# Patient Record
Sex: MALE | Race: WHITE | NOT HISPANIC OR LATINO | URBAN - METROPOLITAN AREA
[De-identification: names, ages, dates, MRNs, and addresses within clinical notes are randomized per-mention and may not be internally consistent; named-entity substitution may affect disease eponyms.]

---

## 2018-03-29 ENCOUNTER — EMERGENCY (EMERGENCY)
Facility: HOSPITAL | Age: 28
LOS: 0 days | Discharge: HOME | End: 2018-03-29
Attending: EMERGENCY MEDICINE

## 2018-03-29 VITALS
HEART RATE: 115 BPM | SYSTOLIC BLOOD PRESSURE: 142 MMHG | DIASTOLIC BLOOD PRESSURE: 91 MMHG | RESPIRATION RATE: 18 BRPM | HEIGHT: 68 IN | TEMPERATURE: 97 F | WEIGHT: 169.98 LBS | OXYGEN SATURATION: 98 %

## 2018-03-29 VITALS
HEART RATE: 98 BPM | RESPIRATION RATE: 18 BRPM | OXYGEN SATURATION: 100 % | DIASTOLIC BLOOD PRESSURE: 73 MMHG | SYSTOLIC BLOOD PRESSURE: 133 MMHG

## 2018-03-29 DIAGNOSIS — Z98.890 OTHER SPECIFIED POSTPROCEDURAL STATES: Chronic | ICD-10-CM

## 2018-03-29 DIAGNOSIS — Z90.49 ACQUIRED ABSENCE OF OTHER SPECIFIED PARTS OF DIGESTIVE TRACT: ICD-10-CM

## 2018-03-29 DIAGNOSIS — L50.9 URTICARIA, UNSPECIFIED: ICD-10-CM

## 2018-03-29 DIAGNOSIS — R21 RASH AND OTHER NONSPECIFIC SKIN ERUPTION: ICD-10-CM

## 2018-03-29 DIAGNOSIS — R00.0 TACHYCARDIA, UNSPECIFIED: ICD-10-CM

## 2018-03-29 RX ORDER — DIPHENHYDRAMINE HCL 50 MG
50 CAPSULE ORAL ONCE
Qty: 0 | Refills: 0 | Status: COMPLETED | OUTPATIENT
Start: 2018-03-29 | End: 2018-03-29

## 2018-03-29 RX ADMIN — Medication 60 MILLIGRAM(S): at 09:44

## 2018-03-29 RX ADMIN — Medication 50 MILLIGRAM(S): at 09:43

## 2018-03-29 NOTE — ED PROVIDER NOTE - NS ED ROS FT
Review of Systems    Constitutional: (-) fever  Eyes/ENT: No lip, throat, tongue swelling  Cardiovascular: (-) chest pain, (-) syncope  Respiratory: (-) cough, (-) shortness of breath  Gastrointestinal: (-) vomiting, (-) diarrhea  Integumentary: (+) rash,   Allergic/Immunologic: (-) pruritus

## 2018-03-29 NOTE — ED ADULT TRIAGE NOTE - CHIEF COMPLAINT QUOTE
"I broke out in a rash yesterday. It's itchy. My feet are red and swollen on the bottom as well." Pt states he had an earache and sore throat for three days. That resolved yesterday but then rash occurred. Symptoms unrelieved by Benadryl (taken yesterday) and Hydrocortisone cream (taken yesterday and today).

## 2018-03-29 NOTE — ED PROVIDER NOTE - ATTENDING CONTRIBUTION TO CARE
I personally evaluated the patient. I reviewed the Resident’s or Physician Assistant’s note (as assigned above), and agree with the findings and plan except as documented in my note.  urticaria as above, normal phonation, no stridor, no swelling to lips, tongue, floor of mouth, palate, or uvula, lungs cta

## 2018-03-29 NOTE — ED PROVIDER NOTE - PHYSICAL EXAMINATION
Gen: Alert, NAD, well appearing  Head: NC, AT, PERRL, EOMI, normal lids/conjunctiva  ENT: normal hearing, patent oropharynx without erythema/exudate. No lip, throat, tongue swelling  Pulm: Bilateral BS, normal resp effort, no wheeze/stridor/retractions  CV: s1s2, tachy  Skin: +urticaria like rash to noted to extremities and trunk  Neuro: AAOx3, no sensory/motor deficits

## 2018-03-29 NOTE — ED PROVIDER NOTE - OBJECTIVE STATEMENT
27 yo male c/o rash to body. This past week he had sore throat and ear pain which resolved. Yesterday he noticed rash to body. +itchy. Took Benadryl yesterday with improvement but rash is coming and going. No SOB. No lip, throat, tongue swelling.

## 2018-09-26 ENCOUNTER — OUTPATIENT (OUTPATIENT)
Dept: OUTPATIENT SERVICES | Facility: HOSPITAL | Age: 28
LOS: 1 days | Discharge: HOME | End: 2018-09-26

## 2018-09-26 DIAGNOSIS — F11.20 OPIOID DEPENDENCE, UNCOMPLICATED: ICD-10-CM

## 2018-09-26 DIAGNOSIS — Z98.890 OTHER SPECIFIED POSTPROCEDURAL STATES: Chronic | ICD-10-CM

## 2018-10-09 ENCOUNTER — OUTPATIENT (OUTPATIENT)
Dept: OUTPATIENT SERVICES | Facility: HOSPITAL | Age: 28
LOS: 1 days | Discharge: HOME | End: 2018-10-09

## 2018-10-09 DIAGNOSIS — F11.20 OPIOID DEPENDENCE, UNCOMPLICATED: ICD-10-CM

## 2018-10-09 DIAGNOSIS — Z98.890 OTHER SPECIFIED POSTPROCEDURAL STATES: Chronic | ICD-10-CM

## 2022-08-02 ENCOUNTER — OUTPATIENT (OUTPATIENT)
Dept: OUTPATIENT SERVICES | Facility: HOSPITAL | Age: 32
LOS: 1 days | Discharge: HOME | End: 2022-08-02

## 2022-08-02 ENCOUNTER — APPOINTMENT (OUTPATIENT)
Dept: PSYCHIATRY | Facility: CLINIC | Age: 32
End: 2022-08-02

## 2022-08-02 DIAGNOSIS — F10.20 ALCOHOL DEPENDENCE, UNCOMPLICATED: ICD-10-CM

## 2022-08-02 DIAGNOSIS — Z98.890 OTHER SPECIFIED POSTPROCEDURAL STATES: Chronic | ICD-10-CM

## 2022-08-09 ENCOUNTER — APPOINTMENT (OUTPATIENT)
Dept: PSYCHIATRY | Facility: CLINIC | Age: 32
End: 2022-08-09

## 2022-08-11 ENCOUNTER — OUTPATIENT (OUTPATIENT)
Dept: OUTPATIENT SERVICES | Facility: HOSPITAL | Age: 32
LOS: 1 days | Discharge: HOME | End: 2022-08-11

## 2022-08-11 ENCOUNTER — APPOINTMENT (OUTPATIENT)
Dept: PSYCHIATRY | Facility: CLINIC | Age: 32
End: 2022-08-11

## 2022-08-11 DIAGNOSIS — F11.20 OPIOID DEPENDENCE, UNCOMPLICATED: ICD-10-CM

## 2022-08-11 DIAGNOSIS — Z98.890 OTHER SPECIFIED POSTPROCEDURAL STATES: Chronic | ICD-10-CM

## 2023-07-07 ENCOUNTER — APPOINTMENT (OUTPATIENT)
Dept: ORTHOPEDIC SURGERY | Facility: CLINIC | Age: 33
End: 2023-07-07
Payer: OTHER MISCELLANEOUS

## 2023-07-07 PROBLEM — Z00.00 ENCOUNTER FOR PREVENTIVE HEALTH EXAMINATION: Status: ACTIVE | Noted: 2023-07-07

## 2023-07-07 PROCEDURE — 99203 OFFICE O/P NEW LOW 30 MIN: CPT | Mod: ACP,25

## 2023-07-07 PROCEDURE — 73610 X-RAY EXAM OF ANKLE: CPT | Mod: LT

## 2023-07-07 PROCEDURE — 29405 APPL SHORT LEG CAST: CPT | Mod: LT

## 2023-07-11 NOTE — PHYSICAL EXAM
[Left] : left foot and ankle [Moderate] : moderate diffused ankle swelling [2+] : posterior tibialis pulse: 2+ [] : no calcaneus tenderness [FreeTextEntry9] : Limited ROM secondary to swelling/pain [de-identified] : Difficult to assess secondary to limited ROM/pain

## 2023-07-11 NOTE — IMAGING
[de-identified] : Weightbearing x-rays taken of the patient's left ankle in office today revealed a mildly displaced fracture of the medial malleolus with soft tissue swelling noted.  Otherwise, no other significant normalities were seen.
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
## 2023-07-11 NOTE — IMAGING
[de-identified] : Weightbearing x-rays taken of the patient's left ankle in office today revealed a mildly displaced fracture of the medial malleolus with soft tissue swelling noted.  Otherwise, no other significant normalities were seen.
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
## 2023-07-11 NOTE — PHYSICAL EXAM
[Left] : left foot and ankle [Moderate] : moderate diffused ankle swelling [2+] : posterior tibialis pulse: 2+ [] : no calcaneus tenderness [FreeTextEntry9] : Limited ROM secondary to swelling/pain [de-identified] : Difficult to assess secondary to limited ROM/pain
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
## 2023-07-13 ENCOUNTER — APPOINTMENT (OUTPATIENT)
Dept: PAIN MANAGEMENT | Facility: CLINIC | Age: 33
End: 2023-07-13

## 2023-07-14 ENCOUNTER — APPOINTMENT (OUTPATIENT)
Dept: PAIN MANAGEMENT | Facility: CLINIC | Age: 33
End: 2023-07-14
Payer: OTHER MISCELLANEOUS

## 2023-07-14 DIAGNOSIS — G44.309 POST-TRAUMATIC HEADACHE, UNSPECIFIED, NOT INTRACTABLE: ICD-10-CM

## 2023-07-14 DIAGNOSIS — M54.2 CERVICALGIA: ICD-10-CM

## 2023-07-14 DIAGNOSIS — M54.12 RADICULOPATHY, CERVICAL REGION: ICD-10-CM

## 2023-07-14 DIAGNOSIS — M54.50 LOW BACK PAIN, UNSPECIFIED: ICD-10-CM

## 2023-07-14 DIAGNOSIS — M54.6 PAIN IN THORACIC SPINE: ICD-10-CM

## 2023-07-14 PROCEDURE — 99204 OFFICE O/P NEW MOD 45 MIN: CPT

## 2023-07-17 ENCOUNTER — APPOINTMENT (OUTPATIENT)
Dept: ORTHOPEDIC SURGERY | Facility: CLINIC | Age: 33
End: 2023-07-17
Payer: OTHER MISCELLANEOUS

## 2023-07-17 PROCEDURE — 73610 X-RAY EXAM OF ANKLE: CPT | Mod: LT

## 2023-07-17 PROCEDURE — 99213 OFFICE O/P EST LOW 20 MIN: CPT | Mod: ACP,25

## 2023-07-17 PROCEDURE — 29405 APPL SHORT LEG CAST: CPT

## 2023-07-17 NOTE — DISCUSSION/SUMMARY
[de-identified] : At this time the patient was placed in a new well-fitted well molded fiberglass short leg cast.  Patient was again advised he should be nonweightbearing with crutches and should not be walking on the cast.  I sent a prescription for 81 mg aspirin twice daily to the pharmacy for DVT prophylaxis.  Patient again was requesting Percocet.  He saw pain management a few days ago.  I discussed with him he needs to discuss all pain medications with pain management.  He was also requesting a prescription for Seroquel which I also advised we cannot prescribe, that he needs to get that from his primary doctor or whoever normally prescribes that to him.  We will see him back in 2 weeks for repeat x-rays and evaluation. Patient will call me if any other problems or concerns.  Patient verbalized understanding and agreed with the plan, all questions were answered in the office today.\par

## 2023-07-17 NOTE — HISTORY OF PRESENT ILLNESS
[de-identified] : 33-year-old male is here today for a cast check.  Patient states the bottom of his cast is falling off.  Patient was placed in a cast 10 days ago for a medial malleolus fracture.  He was advised to be nonweightbearing but has been walking on the cast.  He states he is still having pain in the ankle.  He denies any calf pain.

## 2023-07-17 NOTE — IMAGING
[de-identified] : On examination of his left ankle, the cast was removed.  Skin is intact, there is a small healing abrasion on the anterior aspect of the ankle and the great toe.  No erythema, no ecchymosis.  He  over the medial malleolus.  Calf is soft and nontender.  He is able to dorsiflex and plantarflex, sensation is intact throughout, 2+ DP and PT pulses.\par \par X-rays repeated in the office today of the left ankle show a nondisplaced medial malleolus fracture, the alignment is unchanged.  X-rays were reviewed with Dr. Urena.

## 2023-07-18 NOTE — PHYSICAL EXAM
[de-identified] : Cervical Spine Exam:\par \par Inspection:\par erythema (-) \par ecchymosis (-) \par rashes (-) \par \par Palpation:                                        \par Cervical paraspinal mm tenderness:   R (+); L(+)\par Upper trapezius mm tenderness:        R (+); L (+)\par Rhomboids tenderness:                       R (+); L (+)\par Scalenes mm tenderness:                   R (-); L (-)\par Occipital Ridge:                                    R (-); L (-)\par Supraspinatus mm tenderness:           R (-); L (-)\par \par ROM:  \par limited rom 2/2 to pain\par \par Strength Testing:            Right    Left\par Deltoid                             (5/5)    (5/5)\par Biceps:                            (5/5)    (5/5)\par Triceps:                           (5/5)    (5/5)\par Finger Abductors:           (5/5)    (5/5)\par Grasp:                             (5/5)    (5/5)\par \par Special Testing:\par Spurling Test:                  R (-); L (-)\par Facet load test:               R (+); L (+)          \par \par \par Lumbar Spine Exam:\par Inspection:\par erythema (-)\par ecchymosis (-)\par rashes (-)\par alignment: no scoliosis\par \par Palpation:\par Midline lumbar tenderness:             (-)\par paraspinal tenderness:                  L (+) ; R (+)\par sciatic nerve tenderness :             L (+) ; R (+)\par SI joint tenderness:                        L (-) ; R (-)\par GTB tenderness:                            L (-);  R (-)\par \par Limited ROM 2/2 to pain\par \par Strength:\par 5/5 throughout all muscle groups of the lower extremity\par \par                                    Right       Left   \par Hip Flexion:                (5/5)       (5/5)\par Quadriceps:               (5/5)       (5/5)\par Hamstrings:                (5/5)       (5/5)\par Ankle Dorsiflexion:     (5/5)       (5/5)\par EHL:                           (5/5)       (5/5)\par Ankle Plantarflexion:  (5/5)       (5/5)\par \par Special Tests:\par SLR:                           R (-) ; L (+)\par Facet loading:            R (-) ; L (-)\par CHANTAL test:               R (-) ; L (-)\par \par Neurologic:\par Light touch intact throughout LE \par Reflexes normal and symmetric \par \par Gait:\par antalgic gait 2/2 to foot cast\par ambulates w/o assistive device

## 2023-07-18 NOTE — DISCUSSION/SUMMARY
[de-identified] : Recommendations\par 1. MRI cervical spine w/o contrast to evaluate for anatomic changes of the lumbar discs, nerves, and surrounding tissue that will help provide information to accurately diagnose the patient's cause of pain and therefore treat said pain generator in the most effective way possible - whether that be specific physical therapy recommendations, medications, and/or interventional therapies. \par \par 2. MRI lumbar spine w/o contrast to evaluate for anatomic changes of the lumbar discs, nerves, and surrounding tissue that will help provide information to accurately diagnose the patient's cause of pain and therefore treat said pain generator in the most effective way possible - whether that be specific physical therapy recommendations, medications, and/or interventional therapies. \par \par 3. MRI head w/o contrast\par \par 4. MRI thoracic spine w/o contrast to evaluate for anatomic changes of the lumbar discs, nerves, and surrounding tissue that will help provide information to accurately diagnose the patient's cause of pain and therefore treat said pain generator in the most effective way possible - whether that be specific physical therapy recommendations, medications, and/or interventional therapies. \par \par 5. We are obtaining an EMG to assess the health of muscles and the nerves that control them\par \par 6. methocarbamol 750mg qHS\par We are prescribing a muscle relaxant medication to help the patient's muscle spasm pain. The patient understands to not take this medication before driving or operating any heavy machinery as it can be sedating.\par
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
## 2023-07-20 ENCOUNTER — NON-APPOINTMENT (OUTPATIENT)
Age: 33
End: 2023-07-20

## 2023-07-31 ENCOUNTER — APPOINTMENT (OUTPATIENT)
Dept: ORTHOPEDIC SURGERY | Facility: CLINIC | Age: 33
End: 2023-07-31
Payer: OTHER MISCELLANEOUS

## 2023-07-31 PROCEDURE — L4350: CPT | Mod: LT

## 2023-07-31 PROCEDURE — 73610 X-RAY EXAM OF ANKLE: CPT | Mod: LT

## 2023-07-31 PROCEDURE — 99213 OFFICE O/P EST LOW 20 MIN: CPT

## 2023-08-02 NOTE — PHYSICAL EXAM
[de-identified] : Skin is intact.  No deformity.  He demonstrates full range of motion of the ankle hindfoot midfoot forefoot.  He is minimally tender to the medial malleolus.  No deformity.  Neurovascular intact distally.

## 2023-08-02 NOTE — DATA REVIEWED
[FreeTextEntry1] : 3 views of the patient's left ankle ordered and reviewed by me personally.  They demonstrate evidence of a essentially healed medial malleolus fracture.  The ankle joint is congruent.
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
## 2023-08-02 NOTE — DISCUSSION/SUMMARY
[de-identified] : Patient is actually doing quite well in regards to his left ankle.  I would like him to start weightbearing as tolerated in a Aircast.  He can utilize home range of motion exercises.  I will see him back in 5 weeks for repeat clinical and radiographic exam.  All questions answered

## 2023-08-28 ENCOUNTER — APPOINTMENT (OUTPATIENT)
Dept: ORTHOPEDIC SURGERY | Facility: CLINIC | Age: 33
End: 2023-08-28
Payer: OTHER MISCELLANEOUS

## 2023-08-28 PROCEDURE — 99213 OFFICE O/P EST LOW 20 MIN: CPT

## 2023-08-28 PROCEDURE — 73610 X-RAY EXAM OF ANKLE: CPT | Mod: LT
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
## 2023-08-28 NOTE — DATA REVIEWED
[FreeTextEntry1] : 3 views of the patient's left ankle ordered reviewed by me personally.  X-rays demonstrate a healed medial malleolus fracture.  The ankle joint is congruent.

## 2023-08-28 NOTE — HISTORY OF PRESENT ILLNESS
[de-identified] : 33-year-old patient here for follow-up of his left medial malleolus fracture.  He is doing better.  He is wearing the brace.  He has not yet started physical therapy.  He has pain at times but overall the pain is improving.  Does not endorse any other symptoms.

## 2023-08-28 NOTE — PHYSICAL EXAM
[de-identified] : Tender at the medial malleolus alone.  This is less so than her prior visit.  Grossly intact range of motion.  Negative Homans' sign.  Neurovascular intact.

## 2023-09-25 ENCOUNTER — APPOINTMENT (OUTPATIENT)
Dept: ORTHOPEDIC SURGERY | Facility: CLINIC | Age: 33
End: 2023-09-25
Payer: OTHER MISCELLANEOUS

## 2023-09-25 PROCEDURE — 73610 X-RAY EXAM OF ANKLE: CPT | Mod: LT

## 2023-09-25 PROCEDURE — 99213 OFFICE O/P EST LOW 20 MIN: CPT

## 2023-11-15 ENCOUNTER — APPOINTMENT (OUTPATIENT)
Dept: ORTHOPEDIC SURGERY | Facility: CLINIC | Age: 33
End: 2023-11-15
Payer: OTHER MISCELLANEOUS

## 2023-11-15 VITALS — HEIGHT: 70 IN | WEIGHT: 195 LBS | BODY MASS INDEX: 27.92 KG/M2

## 2023-11-15 PROCEDURE — 99204 OFFICE O/P NEW MOD 45 MIN: CPT

## 2023-11-15 PROCEDURE — L3908: CPT | Mod: LT

## 2023-12-06 ENCOUNTER — APPOINTMENT (OUTPATIENT)
Dept: ORTHOPEDIC SURGERY | Facility: CLINIC | Age: 33
End: 2023-12-06
Payer: OTHER MISCELLANEOUS

## 2023-12-06 PROCEDURE — 99213 OFFICE O/P EST LOW 20 MIN: CPT

## 2023-12-06 PROCEDURE — 73610 X-RAY EXAM OF ANKLE: CPT | Mod: LT

## 2023-12-07 NOTE — ED PROVIDER NOTE - CARE PLAN
If any questions arise, call your provider.  If your provider is not available, please feel free to call the [facility] at [phone].    MEDICATIONS: Resume taking daily medication.  Take prescribed pain medication with food.  If no medication is prescribed, you may take non-aspirin pain medication if needed.  PAIN MEDICATION CAN BE VERY CONSTIPATING.  Take a stool softener or laxative such as senokot, pericolace, or milk of magnesia if needed.    Medication given to you : Tylenol at 2:40 PM, next dose can be given at 8:40 PM    What to Expect Post Anesthesia    Rest and take it easy for the first 24 hours.  A responsible adult is recommended to remain with you during that time.  It is normal to feel sleepy.  We encourage you to not do anything that requires balance, judgment or coordination.    FOR 24 HOURS DO NOT:  Drive, operate machinery or run household appliances.  Drink beer or alcoholic beverages.  Make important decisions or sign legal documents.    To avoid nausea, slowly advance diet as tolerated, avoiding spicy or greasy foods for the first day.  Add more substantial food to your diet according to your provider's instructions.  Babies can be fed formula or breast milk as soon as they are hungry.  INCREASE FLUIDS AND FIBER TO AVOID CONSTIPATION.    MILD FLU-LIKE SYMPTOMS ARE NORMAL.  YOU MAY EXPERIENCE GENERALIZED MUSCLE ACHES, THROAT IRRITATION, HEADACHE AND/OR SOME NAUSEA.      
Principal Discharge DX:	Rash

## 2023-12-14 ENCOUNTER — NON-APPOINTMENT (OUTPATIENT)
Age: 33
End: 2023-12-14

## 2023-12-15 ENCOUNTER — APPOINTMENT (OUTPATIENT)
Dept: ORTHOPEDIC SURGERY | Facility: CLINIC | Age: 33
End: 2023-12-15
Payer: OTHER MISCELLANEOUS

## 2023-12-15 PROCEDURE — 99213 OFFICE O/P EST LOW 20 MIN: CPT

## 2023-12-15 NOTE — ASSESSMENT
[FreeTextEntry1] : Patient comes in for follow-up of his wrist.  He is doing better than prior.  He still having some discomfort.  He is currently in therapy for his foot.  He is interested in therapy for the wrist.  The therapist that he goes to told him that they could do therapy on his wrist.  He says he is doing better than prior.  He says he still gets numbness and tingling at night but he does not always wear the brace at night and he also says that he is on a couch so he is not sure if that has anything to do with it as well.  He does use a wrist widget but does not use it to drive because he finds it to be annoying.  L hand:  Tender volar wrist  Good finger ROM  +Tinels  +Phalens  +Compression test  normal sensation median nerve distribution  L wrist: Swelling Tender TFCC Pain with pronation/supination Decreased wrist ROM +TFCC grind we reviewed the anatomy, pathology, and treatment options for TFCC tears. We discussed that most of the TFCC is avascular and tears are slow to heal if at all but that surgical results are not guaranteed due to the poor healing capacity of the structure.  Even at surgery, most tears cannot be repaired, but are merely debrided.  We discussed the use of nsaids, bracing, rest, local modalities, injection and surgery in the treatment of TFCC tears. At this point, the patient will proceed with non-operative treatment.  The patient is going to continue using the wrist widget.  As long as he improves he will continue that fashion.  In addition I believe it is acceptable to do therapy.  While it may not fully help him and may offer him significant relief and I have had mixed results and therefore I believe that it would be appropriate.  The patient was advised of the diagnosis.  The natural history of the pathology was explained in full to the patient in layman's terms. We discussed the nature of the nerve as an electrical cable and what happens to the nerve in carpal tunnel syndrome.  We discussed that treatment for night symptoms included night bracing.  We discussed the possibility of injection when symptoms were intermittent or in patients who were unwilling to undergo surgery with constant symptoms.  We discussed that injection is a diagnostic and therapeutic aide and what this means.  We discussed the use of nerve testing in cases when diagnosis was in doubt or for confirmation to exclude alternate pathology.  We discussed that if symptoms were 24/7 surgery was recommended to give the nerve the best chance to recover but that once symptoms were constant, the nerve may not recover even with surgery.  We discussed that if left alone the nerve progression could worsen and that treatment was indicated to prevent progression of nerve compression.  The longer the nerve is left, the more likely to cause worsening irreversible damage.  All questions were answered.  The risks and benefits of surgical and non-surgical treatment alternatives were explained in full to the patient.  I am hoping that wearing the brace at night will help with the numbness and tingling.  If it becomes persistent and regular we may consider an injection.

## 2023-12-15 NOTE — WORK
[Total (100%)] : total (100%) [Does not reveal pre-existing condition(s) that may affect treatment/prognosis] : does not reveal pre-existing condition(s) that may affect treatment/prognosis [Cannot return to work because ________] : cannot return to work because [unfilled] [I provided the services listed above] :  I provided the services listed above. [FreeTextEntry1] : fair [FreeTextEntry3] : lg

## 2024-01-26 ENCOUNTER — APPOINTMENT (OUTPATIENT)
Dept: ORTHOPEDIC SURGERY | Facility: CLINIC | Age: 34
End: 2024-01-26

## 2024-03-18 ENCOUNTER — APPOINTMENT (OUTPATIENT)
Dept: ORTHOPEDIC SURGERY | Facility: CLINIC | Age: 34
End: 2024-03-18
Payer: OTHER MISCELLANEOUS

## 2024-03-18 DIAGNOSIS — S82.52XA DISPLACED FRACTURE OF MEDIAL MALLEOLUS OF LEFT TIBIA, INITIAL ENCOUNTER FOR CLOSED FRACTURE: ICD-10-CM

## 2024-03-18 PROCEDURE — 99243 OFF/OP CNSLTJ NEW/EST LOW 30: CPT

## 2024-03-19 ENCOUNTER — APPOINTMENT (OUTPATIENT)
Dept: ORTHOPEDIC SURGERY | Facility: CLINIC | Age: 34
End: 2024-03-19
Payer: OTHER MISCELLANEOUS

## 2024-03-19 DIAGNOSIS — S69.82XA OTHER SPECIFIED INJURIES OF LEFT WRIST, HAND AND FINGER(S), INITIAL ENCOUNTER: ICD-10-CM

## 2024-03-19 DIAGNOSIS — G56.02 CARPAL TUNNEL SYNDROME, LEFT UPPER LIMB: ICD-10-CM

## 2024-03-19 PROCEDURE — 99243 OFF/OP CNSLTJ NEW/EST LOW 30: CPT

## 2024-03-19 NOTE — ASSESSMENT
[FreeTextEntry1] : The patient comes in for a schedule loss of use. The patient states he is doing well. He states he has numbness and tingling sometimes.    Left Wrist: 90 degrees flexion, 60 degrees extension. 40 degrees ulnar deviation, 10 degrees radial deviation 80 degrees supination , 80 degrees pronation, negative Tinel's, positive Durkan's  dynamometer 115 lbs of force, 75 lbs of force, 55 lbs of force  Right Wrist: 90  degrees flexion, 80 degrees extension , 40 degrees ulnar deviation, 20 degrees radial deviation full supination, full pronation, negative Tinel's, negative Durkan's  dynamometer 90lbs of force, 80lbs of force, 78 lbs of force.  :

## 2024-03-20 NOTE — HISTORY OF PRESENT ILLNESS
[de-identified] : Patient is here for scheduled loss use.  This patient is experiencing some mild residual pain above the medial malleolus which is worsened with weightbearing.  Denies any fevers chills calf pain chest pain shortness of breath.
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
## 2024-03-20 NOTE — PHYSICAL EXAM
[de-identified] : He is alert oriented x 3.  He is pleasant cooperative that exam.  I examined his left lower extremity.  He is slightly tender to the medial malleolus.  No bony crepitus.  No instability at the fracture site.  I then measured the range of motion of his left ankle as well as his right ankle for comparison sake utilizing a goniometer.  Both active and passive range of motion were utilized and 3 separate measurements were taken to ensure consistent measurement.  Below are the averages of the 3 separate measurements.  Left ankle  Dorsiflexion 15 degrees Plantarflexion 30 degrees Eversion 20 degrees Inversion 30 degrees  Right ankle  Dorsiflexion 20 degrees Plantarflexion 40 degrees Eversion 20 degrees Inversion 30 degrees  30 percent loss considering range of motion changes in the presence of a prior malleoli fracture

## 2024-04-15 ENCOUNTER — APPOINTMENT (OUTPATIENT)
Dept: CARDIOLOGY | Facility: CLINIC | Age: 34
End: 2024-04-15
Payer: COMMERCIAL

## 2024-04-15 VITALS
OXYGEN SATURATION: 97 % | HEIGHT: 70 IN | BODY MASS INDEX: 29.63 KG/M2 | DIASTOLIC BLOOD PRESSURE: 92 MMHG | WEIGHT: 207 LBS | HEART RATE: 114 BPM | SYSTOLIC BLOOD PRESSURE: 147 MMHG

## 2024-04-15 DIAGNOSIS — Z72.89 OTHER PROBLEMS RELATED TO LIFESTYLE: ICD-10-CM

## 2024-04-15 DIAGNOSIS — R00.0 TACHYCARDIA, UNSPECIFIED: ICD-10-CM

## 2024-04-15 PROCEDURE — 99204 OFFICE O/P NEW MOD 45 MIN: CPT | Mod: 25

## 2024-04-15 PROCEDURE — 93000 ELECTROCARDIOGRAM COMPLETE: CPT

## 2024-04-15 RX ORDER — MELOXICAM 15 MG/1
15 TABLET ORAL
Qty: 30 | Refills: 0 | Status: DISCONTINUED | COMMUNITY
Start: 2023-07-14 | End: 2024-04-15

## 2024-04-15 RX ORDER — METHOCARBAMOL 750 MG/1
750 TABLET, FILM COATED ORAL
Qty: 30 | Refills: 0 | Status: DISCONTINUED | COMMUNITY
Start: 2023-07-14 | End: 2024-04-15

## 2024-04-15 RX ORDER — TRAMADOL HYDROCHLORIDE 50 MG/1
50 TABLET, COATED ORAL
Qty: 20 | Refills: 0 | Status: DISCONTINUED | COMMUNITY
Start: 2023-07-08 | End: 2024-04-15

## 2024-04-15 RX ORDER — ASPIRIN ENTERIC COATED TABLETS 81 MG 81 MG/1
81 TABLET, DELAYED RELEASE ORAL TWICE DAILY
Qty: 60 | Refills: 0 | Status: DISCONTINUED | COMMUNITY
Start: 2023-07-17 | End: 2024-04-15

## 2024-04-15 NOTE — ASSESSMENT
[FreeTextEntry1] : 35 yo noted today EKG  sinus tach inc RBBB. He denies chest pain or sob. No mcdonald. He boxes at night  He in am does wts  90 minutes 6/week. He denies palpitations. . He boxes 3 minute rounds 5 rounds . He does 3 days a week. He has sinus tach for years. Dad has it also. But running HR only goes to 150 BPM. He is in excellent condition. No cardiac contraindication to being a . He is fit to do  work of a . He was told to stop vapping.

## 2024-04-15 NOTE — HISTORY OF PRESENT ILLNESS
[FreeTextEntry1] : 35 yo noted today EKG  sinus tach inc RBBB. He denies chest pain or sob. No mcdonald. He boxes at night  He in am does wts  90 minutes 6/week. He denies palpitations.

## 2024-04-15 NOTE — PHYSICAL EXAM
[Well Developed] : well developed [Well Nourished] : well nourished [Normal Conjunctiva] : normal conjunctiva [Normal Venous Pressure] : normal venous pressure [No Carotid Bruit] : no carotid bruit [Normal S1, S2] : normal S1, S2 [No Murmur] : no murmur [Clear Lung Fields] : clear lung fields [Good Air Entry] : good air entry [Soft] : abdomen soft [Non Tender] : non-tender [Normal Gait] : normal gait [No Edema] : no edema [No Cyanosis] : no cyanosis [No Rash] : no rash [No Skin Lesions] : no skin lesions [Moves all extremities] : moves all extremities [No Focal Deficits] : no focal deficits [Alert and Oriented] : alert and oriented [Normal memory] : normal memory

## 2024-04-30 ENCOUNTER — APPOINTMENT (OUTPATIENT)
Dept: PAIN MANAGEMENT | Facility: CLINIC | Age: 34
End: 2024-04-30
Payer: OTHER MISCELLANEOUS

## 2024-04-30 DIAGNOSIS — M54.16 RADICULOPATHY, LUMBAR REGION: ICD-10-CM

## 2024-04-30 PROCEDURE — 99213 OFFICE O/P EST LOW 20 MIN: CPT
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
## 2024-05-01 PROBLEM — M54.16 LUMBAR RADICULITIS: Status: ACTIVE | Noted: 2023-07-14

## 2024-05-01 NOTE — PHYSICAL EXAM
[de-identified] : Cervical Spine Exam:\par  \par  Inspection:\par  erythema (-) \par  ecchymosis (-) \par  rashes (-) \par  \par  Palpation:                                        \par  Cervical paraspinal mm tenderness:   R (+); L(+)\par  Upper trapezius mm tenderness:        R (+); L (+)\par  Rhomboids tenderness:                       R (+); L (+)\par  Scalenes mm tenderness:                   R (-); L (-)\par  Occipital Ridge:                                    R (-); L (-)\par  Supraspinatus mm tenderness:           R (-); L (-)\par  \par  ROM:  \par  limited rom 2/2 to pain\par  \par  Strength Testing:            Right    Left\par  Deltoid                             (5/5)    (5/5)\par  Biceps:                            (5/5)    (5/5)\par  Triceps:                           (5/5)    (5/5)\par  Finger Abductors:           (5/5)    (5/5)\par  Grasp:                             (5/5)    (5/5)\par  \par  Special Testing:\par  Spurling Test:                  R (-); L (-)\par  Facet load test:               R (+); L (+)          \par  \par  \par  Lumbar Spine Exam:\par  Inspection:\par  erythema (-)\par  ecchymosis (-)\par  rashes (-)\par  alignment: no scoliosis\par  \par  Palpation:\par  Midline lumbar tenderness:             (-)\par  paraspinal tenderness:                  L (+) ; R (+)\par  sciatic nerve tenderness :             L (+) ; R (+)\par  SI joint tenderness:                        L (-) ; R (-)\par  GTB tenderness:                            L (-);  R (-)\par  \par  Limited ROM 2/2 to pain\par  \par  Strength:\par  5/5 throughout all muscle groups of the lower extremity\par  \par                                     Right       Left   \par  Hip Flexion:                (5/5)       (5/5)\par  Quadriceps:               (5/5)       (5/5)\par  Hamstrings:                (5/5)       (5/5)\par  Ankle Dorsiflexion:     (5/5)       (5/5)\par  EHL:                           (5/5)       (5/5)\par  Ankle Plantarflexion:  (5/5)       (5/5)\par  \par  Special Tests:\par  SLR:                           R (-) ; L (+)\par  Facet loading:            R (-) ; L (-)\par  CHANTAL test:               R (-) ; L (-)\par  \par  Neurologic:\par  Light touch intact throughout LE \par  Reflexes normal and symmetric \par  \par  Gait:\par  antalgic gait 2/2 to foot cast\par  ambulates w/o assistive device
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
## 2024-05-01 NOTE — DISCUSSION/SUMMARY
[de-identified] : A discussion regarding available pain management treatment options occurred with the patient. These included interventional, rehabilitative, pharmacological, and alternative modalities. We will proceed with the following:     Interventional treatment options: - None indicated at this time.   From a pain management standpoint, he is currently at [facility].   Patient can follow up as needed.   I Usha Crocker attest that this documentation has been prepared under the direction and in the presence of provider Dr. Nicanor Cardona.  The documentation recorded by the scribe in my presence, accurately reflects the service I personally performed, and the decisions made by me with my edits as appropriate.   Nicanro Cardona, DO

## 2024-05-01 NOTE — DATA REVIEWED
[FreeTextEntry1] : EMG of the upper extremities taken on 11-6-2023 showed evidence of mild left sensory ulnar nerve neuropathy at the wrist. Reveals mild left sensory medial nerve neuropathy at or about the transcarpal ligament. Consistent with carpal tunnel syndrome.

## 2024-06-25 ENCOUNTER — APPOINTMENT (OUTPATIENT)
Dept: CV DIAGNOSITCS | Facility: HOSPITAL | Age: 34
End: 2024-06-25

## 2025-03-26 ENCOUNTER — APPOINTMENT (OUTPATIENT)
Dept: PAIN MANAGEMENT | Facility: CLINIC | Age: 35
End: 2025-03-26
Payer: OTHER MISCELLANEOUS

## 2025-03-26 DIAGNOSIS — M54.16 RADICULOPATHY, LUMBAR REGION: ICD-10-CM

## 2025-03-26 DIAGNOSIS — M54.12 RADICULOPATHY, CERVICAL REGION: ICD-10-CM

## 2025-03-26 PROCEDURE — 99213 OFFICE O/P EST LOW 20 MIN: CPT

## 2025-04-30 ENCOUNTER — APPOINTMENT (OUTPATIENT)
Dept: SLEEP CENTER | Facility: HOSPITAL | Age: 35
End: 2025-04-30
Payer: MEDICAID

## 2025-04-30 ENCOUNTER — OUTPATIENT (OUTPATIENT)
Dept: OUTPATIENT SERVICES | Facility: HOSPITAL | Age: 35
LOS: 1 days | Discharge: HOME | End: 2025-04-30
Payer: MEDICAID

## 2025-04-30 DIAGNOSIS — G47.33 OBSTRUCTIVE SLEEP APNEA (ADULT) (PEDIATRIC): ICD-10-CM

## 2025-04-30 DIAGNOSIS — Z98.890 OTHER SPECIFIED POSTPROCEDURAL STATES: Chronic | ICD-10-CM

## 2025-04-30 PROCEDURE — 95810 POLYSOM 6/> YRS 4/> PARAM: CPT

## 2025-04-30 PROCEDURE — 95810 POLYSOM 6/> YRS 4/> PARAM: CPT | Mod: 26

## 2025-05-02 DIAGNOSIS — G47.33 OBSTRUCTIVE SLEEP APNEA (ADULT) (PEDIATRIC): ICD-10-CM
